# Patient Record
Sex: FEMALE | Race: AMERICAN INDIAN OR ALASKA NATIVE
[De-identification: names, ages, dates, MRNs, and addresses within clinical notes are randomized per-mention and may not be internally consistent; named-entity substitution may affect disease eponyms.]

---

## 2019-09-09 ENCOUNTER — HOSPITAL ENCOUNTER (EMERGENCY)
Dept: HOSPITAL 5 - ED | Age: 74
Discharge: LEFT BEFORE DISCHARGE | End: 2019-09-09
Payer: MEDICARE

## 2019-09-09 VITALS — DIASTOLIC BLOOD PRESSURE: 77 MMHG | SYSTOLIC BLOOD PRESSURE: 119 MMHG

## 2019-09-09 DIAGNOSIS — Z53.21: ICD-10-CM

## 2019-09-09 DIAGNOSIS — R22.42: Primary | ICD-10-CM

## 2019-09-09 NOTE — EMERGENCY DEPARTMENT REPORT
Blank Doc





- Documentation


Documentation: 





A physician and/or other qualified medical personnel has recommended that the 

patient receive further examination and/or treatment beyond their Medical 

Screening Exam.  The risks and benefits were explained.  The patient was 

informed of their right to emergency care.  Patient left before final 

disposition of their medical condition.  Patient signed AMA form.  Patient was 

instructed to return to ED changes her mind as her condition can be serious if 

not treated and evaluated.

## 2019-09-09 NOTE — EMERGENCY DEPARTMENT REPORT
Blank Doc





- Documentation


Documentation: 





73-year-old female that presents with left knee pain and bilateral ankle swell

ing.  denies any injuries.





This initial assessment/diagnostic orders/clinical plan/treatment(s) is/are 

subject to change based on patient's health status, clinical progression and re-

assessment by fellow clinical providers in the ED.  Further treatment and workup

at subsequent clinical providers discretion.  Patient/guardians urged not to 

elope from the ED as their condition may be serious if not clinically assessed 

and managed.  Initial orders include:


1- Patient sent to MAIN for further evaluation and treatment


2- labs


3- UA

## 2019-09-10 ENCOUNTER — HOSPITAL ENCOUNTER (EMERGENCY)
Dept: HOSPITAL 5 - ED | Age: 74
Discharge: HOME | End: 2019-09-10
Payer: MEDICARE

## 2019-09-10 VITALS — DIASTOLIC BLOOD PRESSURE: 63 MMHG | SYSTOLIC BLOOD PRESSURE: 125 MMHG

## 2019-09-10 DIAGNOSIS — I10: ICD-10-CM

## 2019-09-10 DIAGNOSIS — K21.9: ICD-10-CM

## 2019-09-10 DIAGNOSIS — I25.2: ICD-10-CM

## 2019-09-10 DIAGNOSIS — M25.562: Primary | ICD-10-CM

## 2019-09-10 DIAGNOSIS — Z95.5: ICD-10-CM

## 2019-09-10 PROCEDURE — 73562 X-RAY EXAM OF KNEE 3: CPT

## 2019-09-10 PROCEDURE — 96372 THER/PROPH/DIAG INJ SC/IM: CPT

## 2019-09-10 PROCEDURE — 99283 EMERGENCY DEPT VISIT LOW MDM: CPT

## 2019-09-10 NOTE — EMERGENCY DEPARTMENT REPORT
ED General Adult HPI





- General


Chief complaint: Extremity Problem,Nontraumatic


Stated complaint: L KNEE PAIN/FEET SWOLLEN


Time Seen by Provider: 09/10/19 12:54


Source: patient


Mode of arrival: Ambulatory


Limitations: No Limitations





- History of Present Illness


Initial comments: 





This is a 73-year-old female who presents to eat complaining of left knee pain 

for the past week.  Patient states that about 3 weeks ago she was going up and 

down stairs while visiting her brother.  Patient states that in her home she 

does not go up and stairs in her home.  Patient denies any injury or trauma and 

states that her left knee is aching and throbbing in nature.  Patient has no 

other complaints





- Related Data


                                  Previous Rx's











 Medication  Instructions  Recorded  Last Taken  Type


 


Naproxen [Naprosyn] 500 mg PO BID #30 tablet 09/10/19 Unknown Rx











                                    Allergies











Allergy/AdvReac Type Severity Reaction Status Date / Time


 


No Known Allergies Allergy   Verified 08/24/19 11:39














ED Review of Systems


ROS: 


Stated complaint: L KNEE PAIN/FEET SWOLLEN


Other details as noted in HPI





Comment: All other systems reviewed and negative





ED Past Medical Hx





- Past Medical History


Hx Hypertension: Yes


Hx Heart Attack/AMI: Yes


Hx GERD: Yes





- Surgical History


Past Surgical History?: Yes


Hx Coronary Stent: Yes (2)





- Social History


Smoking Status: Never Smoker


Substance Use Type: Alcohol





- Medications


Home Medications: 


                                Home Medications











 Medication  Instructions  Recorded  Confirmed  Last Taken  Type


 


Naproxen [Naprosyn] 500 mg PO BID #30 tablet 09/10/19  Unknown Rx














ED Physical Exam





- General


Limitations: No Limitations


General appearance: alert, in no apparent distress





- Head


Head exam: Present: atraumatic, normocephalic





- Eye


Eye exam: Present: normal appearance





- ENT


ENT exam: Present: mucous membranes moist





- Neck


Neck exam: Present: normal inspection





- Respiratory


Respiratory exam: Present: normal lung sounds bilaterally.  Absent: respiratory 

distress





- Cardiovascular


Cardiovascular Exam: Present: regular rate, normal rhythm.  Absent: systolic 

murmur, diastolic murmur, rubs, gallop





- GI/Abdominal


GI/Abdominal exam: Present: soft, normal bowel sounds





- Extremities Exam


Extremities exam: Present: normal inspection, full ROM, tenderness (mild 

tenderness to palpation of the anterior aspect of the knee,), normal capillary 

refill, other (sign negative, no calf tenderness or swelling noted bilaterally).

  Absent: joint swelling, calf tenderness





- Back Exam


Back exam: Present: normal inspection, full ROM





- Neurological Exam


Neurological exam: Present: alert, oriented X3





- Psychiatric


Psychiatric exam: Present: normal affect, normal mood





- Skin


Skin exam: Present: warm, dry, intact, normal color.  Absent: rash





ED Course





                                   Vital Signs











  09/10/19





  12:54


 


Temperature 98.3 F


 


Pulse Rate 77


 


Respiratory 16





Rate 


 


Blood Pressure 125/63


 


O2 Sat by Pulse 95





Oximetry 














ED Medical Decision Making





- Radiology Data


Radiology results: report reviewed, image reviewed





Fluoro Time In Minutes:





LEFT KNEE, 3 VIEWS





INDICATION: swelling and pain.





COMPARISON: None





IMPRESSION: No acute osseous or soft tissue abnormality. No significant DJD. 

Small enthesophyte


from the superior border of the patella is noted.





Signer Name: Patel Arellano Jr, MD


Signed: 9/10/2019 1:26 PM


Workstation Name: [id number]








Transcribed By: TTR


Dictated By: PATEL ARELLANO JR, MD


Electronically Authenticated By: PATEL ARELLANO JR, MD


Signed Date/Time: 09/10/19 1326

















- Medical Decision Making


73-year-old female presents to ED with myalgia /athralgia of left knee


ED course: Patient received Toradol and Decadron in ED.


Vital signs are normal patient is in no acute distress


Discussed with patient follow-up with primary care physician.  


Discussed the patient and take medications as prescribed. 


Patient has no neurological deficit.  Patient is alert and oriented 3  and 

understands all instructions given.





Critical care attestation.: 


If time is entered above; I have spent that time in minutes in the direct care 

of this critically ill patient, excluding procedure time.








ED Disposition


Clinical Impression: 


 Arthralgia of knee, left





Disposition: DC-01 TO HOME OR SELFCARE


Is pt being admited?: No


Does the pt Need Aspirin: No


Condition: Stable


Instructions:  Arthralgia (ED)


Additional Instructions: 


Make sure to follow up with the primary care physician as discussed.


Take all your medications as you've been prescribed.


If you have any worsening symptoms or develop new symptoms please return to ED 

immediately.


Prescriptions: 


Naproxen [Naprosyn] 500 mg PO BID #30 tablet


Referrals: 


[facility] [Outside] - 3-5 Days


[facility] [Outside] - 3-5 Days


Forms:  Accompanied Note, Work/School Release Form(ED)


Time of Disposition: 14:13

## 2019-09-10 NOTE — EVENT NOTE
ED Screening Note


Date of service: 09/10/19


Time: 12:54


ED Screening Note: 





This is a 73 y.o. F. that presents to the ER with left knee pain and swelling.  

Patient states the right leg is experiencing similar symptoms.





Denies injury





PMH of HTN





This initial assessment/diagnostic orders/clinical plan/treatment(s) is/are 

subject to change based on patients health status, clinical progression and re-

assessment by fellow clinical providers in the ED. Further treatment and workup 

at subsequent clinical providers discretion. Patient/guardian urged not to elope

from the ED as their condition may be serious if not clinically assessed and 

managed. 





Initial orders include: 





XR left knee

## 2019-09-10 NOTE — XRAY REPORT
LEFT KNEE, 3 VIEWS



INDICATION:  swelling and pain. 



COMPARISON: None



IMPRESSION:  No acute osseous or soft tissue abnormality.    No significant DJD. Small enthesophyte f
rom the superior border of the patella is noted.



Signer Name: Patel Linares Jr, MD 

Signed: 9/10/2019 1:26 PM

 Workstation Name: [id number]